# Patient Record
Sex: MALE | URBAN - METROPOLITAN AREA
[De-identification: names, ages, dates, MRNs, and addresses within clinical notes are randomized per-mention and may not be internally consistent; named-entity substitution may affect disease eponyms.]

---

## 2023-06-29 ENCOUNTER — OFFICE VISIT (OUTPATIENT)
Facility: LOCATION | Age: 33
End: 2023-06-29

## 2023-06-29 DIAGNOSIS — H33.011 RETINAL DETACHMENT WITH SINGLE BREAK, RIGHT EYE: Primary | ICD-10-CM

## 2023-06-29 PROCEDURE — 99204 OFFICE O/P NEW MOD 45 MIN: CPT

## 2023-06-29 ASSESSMENT — INTRAOCULAR PRESSURE
OS: 14
OD: 18

## 2023-06-29 NOTE — IMPRESSION/PLAN
Impression: Retinal detachment with single break, right eye: H33.011. Plan: Pt educated on condition. Referred to retina ASAP. Expressed the importance of going to the f/u appointment as this is a blinding condition. Pt expressed understanding.

## 2023-06-30 ENCOUNTER — OFFICE VISIT (OUTPATIENT)
Dept: URBAN - METROPOLITAN AREA CLINIC 13 | Facility: CLINIC | Age: 33
End: 2023-06-30

## 2023-06-30 DIAGNOSIS — H35.412 LATTICE DEGENERATION OF RETINA, LEFT EYE: ICD-10-CM

## 2023-06-30 PROCEDURE — 92134 CPTRZ OPH DX IMG PST SGM RTA: CPT | Performed by: STUDENT IN AN ORGANIZED HEALTH CARE EDUCATION/TRAINING PROGRAM

## 2023-06-30 PROCEDURE — 99204 OFFICE O/P NEW MOD 45 MIN: CPT | Performed by: STUDENT IN AN ORGANIZED HEALTH CARE EDUCATION/TRAINING PROGRAM

## 2023-06-30 RX ORDER — CIPROFLOXACIN HYDROCHLORIDE 3 MG/ML
0.3 % SOLUTION/ DROPS OPHTHALMIC
Qty: 5 | Refills: 1 | Status: INACTIVE
Start: 2023-06-30 | End: 2023-07-29

## 2023-06-30 RX ORDER — PREDNISOLONE ACETATE 10 MG/ML
1 % SUSPENSION/ DROPS OPHTHALMIC
Qty: 5 | Refills: 2 | Status: INACTIVE
Start: 2023-06-30 | End: 2023-08-28

## 2023-06-30 ASSESSMENT — INTRAOCULAR PRESSURE
OD: 18
OS: 18

## 2023-06-30 NOTE — IMPRESSION/PLAN
Impression: Lattice degeneration of retina, left eye: H35.412.  Plan: -No tear/detachment seen on exam
-Observe
-RD warnings given

## 2023-06-30 NOTE — IMPRESSION/PLAN
Impression: Retinal detachment with single break, right eye: H33.011. OCT: detached OD, wnl OS Optos: mac-off temp RD with ST HST OD;  wnl OS Plan: Mac-off 
-previously struck by racquetball 1.5 months ago
-symptoms of sudden vision loss present for 2 days
-no obvious PVD in spite of the trauma history
-discussed natural history of RD and r/b/a surgery for RD repair
-patient agrees to proceed with surgery and understands the risk for additional central vision loss in spite of surgery PLAN: SB / cryo OD within 1 week

## 2023-07-01 ENCOUNTER — POST-OPERATIVE VISIT (OUTPATIENT)
Dept: URBAN - METROPOLITAN AREA CLINIC 7 | Facility: CLINIC | Age: 33
End: 2023-07-01

## 2023-07-01 PROCEDURE — 99024 POSTOP FOLLOW-UP VISIT: CPT | Performed by: OPHTHALMOLOGY

## 2023-07-01 ASSESSMENT — INTRAOCULAR PRESSURE
OD: 18
OS: 14

## 2023-07-01 NOTE — IMPRESSION/PLAN
Impression: S/P SB / cryo OD - 1 Day. Retinal detachment with single break, right eye  H33.011. Plan: PF/Oflox QID OD Sleep on left side Gas precautions RTC 1 week POS DFE OD w/ KAROLINE

## 2023-07-07 ENCOUNTER — POST-OPERATIVE VISIT (OUTPATIENT)
Dept: URBAN - METROPOLITAN AREA CLINIC 13 | Facility: CLINIC | Age: 33
End: 2023-07-07

## 2023-07-07 DIAGNOSIS — Z48.810 ENCOUNTER FOR SURGICAL AFTERCARE FOLLOWING SURGERY ON THE SENSE ORGANS: ICD-10-CM

## 2023-07-07 DIAGNOSIS — H33.011 RETINAL DETACHMENT WITH SINGLE BREAK, RIGHT EYE: Primary | ICD-10-CM

## 2023-07-07 PROCEDURE — 99024 POSTOP FOLLOW-UP VISIT: CPT | Performed by: STUDENT IN AN ORGANIZED HEALTH CARE EDUCATION/TRAINING PROGRAM

## 2023-07-07 ASSESSMENT — INTRAOCULAR PRESSURE
OS: 16
OD: 16

## 2023-07-07 NOTE — IMPRESSION/PLAN
Impression: S/P SB / cryo OD - 7 Days. Retinal detachment with single break, right eye  H33.011. Plan: -doing well postop week 1
-stop oflox, taper PF 3/2/1/stop
-gradually resume normal activities
-return precautions discussed
-avoid flat on back 
-gas precautions RTC: 1 month POS OD / OCT OU

## 2023-08-18 ENCOUNTER — POST-OPERATIVE VISIT (OUTPATIENT)
Dept: URBAN - METROPOLITAN AREA CLINIC 13 | Facility: CLINIC | Age: 33
End: 2023-08-18

## 2023-08-18 DIAGNOSIS — H33.011 RETINAL DETACHMENT WITH SINGLE BREAK, RIGHT EYE: Primary | ICD-10-CM

## 2023-08-18 PROCEDURE — 99024 POSTOP FOLLOW-UP VISIT: CPT | Performed by: STUDENT IN AN ORGANIZED HEALTH CARE EDUCATION/TRAINING PROGRAM

## 2023-08-18 PROCEDURE — 92134 CPTRZ OPH DX IMG PST SGM RTA: CPT | Performed by: STUDENT IN AN ORGANIZED HEALTH CARE EDUCATION/TRAINING PROGRAM

## 2023-08-18 ASSESSMENT — INTRAOCULAR PRESSURE
OS: 16
OD: 13